# Patient Record
Sex: FEMALE | Race: WHITE | NOT HISPANIC OR LATINO | Employment: OTHER | URBAN - METROPOLITAN AREA
[De-identification: names, ages, dates, MRNs, and addresses within clinical notes are randomized per-mention and may not be internally consistent; named-entity substitution may affect disease eponyms.]

---

## 2017-03-06 ENCOUNTER — ALLSCRIPTS OFFICE VISIT (OUTPATIENT)
Dept: OTHER | Facility: OTHER | Age: 77
End: 2017-03-06

## 2017-03-07 ENCOUNTER — GENERIC CONVERSION - ENCOUNTER (OUTPATIENT)
Dept: OTHER | Facility: OTHER | Age: 77
End: 2017-03-07

## 2017-04-25 ENCOUNTER — HOSPITAL ENCOUNTER (OUTPATIENT)
Dept: RADIOLOGY | Age: 77
Discharge: HOME | End: 2017-04-25
Admitting: FAMILY MEDICINE
Payer: MEDICARE

## 2017-04-25 ENCOUNTER — TRANSCRIBE ORDERS (OUTPATIENT)
Dept: URGENT CARE | Age: 77
End: 2017-04-25

## 2017-04-25 ENCOUNTER — OFFICE VISIT (OUTPATIENT)
Dept: URGENT CARE | Age: 77
End: 2017-04-25
Payer: MEDICARE

## 2017-04-25 DIAGNOSIS — R05.9 COUGH: ICD-10-CM

## 2017-04-25 PROCEDURE — G0463 HOSPITAL OUTPT CLINIC VISIT: HCPCS | Performed by: FAMILY MEDICINE

## 2017-04-25 PROCEDURE — 71020 HB CHEST X-RAY 2VW FRONTAL&LATL: CPT

## 2017-04-25 PROCEDURE — 94640 AIRWAY INHALATION TREATMENT: CPT | Performed by: FAMILY MEDICINE

## 2017-04-25 PROCEDURE — 99213 OFFICE O/P EST LOW 20 MIN: CPT | Performed by: FAMILY MEDICINE

## 2017-05-02 ENCOUNTER — OFFICE VISIT (OUTPATIENT)
Dept: URGENT CARE | Age: 77
End: 2017-05-02
Payer: MEDICARE

## 2017-05-02 LAB — GLUCOSE SERPL-MCNC: 242 MG/DL (ref 65–140)

## 2017-05-02 PROCEDURE — 82948 REAGENT STRIP/BLOOD GLUCOSE: CPT

## 2017-05-02 PROCEDURE — 99213 OFFICE O/P EST LOW 20 MIN: CPT

## 2017-05-02 PROCEDURE — 94640 AIRWAY INHALATION TREATMENT: CPT | Performed by: FAMILY MEDICINE

## 2017-05-02 PROCEDURE — G0463 HOSPITAL OUTPT CLINIC VISIT: HCPCS

## 2017-05-10 ENCOUNTER — TRANSCRIBE ORDERS (OUTPATIENT)
Dept: LAB | Facility: HOSPITAL | Age: 77
End: 2017-05-10

## 2017-05-10 ENCOUNTER — APPOINTMENT (OUTPATIENT)
Dept: LAB | Facility: HOSPITAL | Age: 77
End: 2017-05-10
Payer: MEDICARE

## 2017-05-10 DIAGNOSIS — E78.5 HYPERLIPIDEMIA, UNSPECIFIED HYPERLIPIDEMIA TYPE: ICD-10-CM

## 2017-05-10 DIAGNOSIS — E11.69 TYPE 2 DIABETES MELLITUS WITH OTHER SPECIFIED COMPLICATION (HCC): ICD-10-CM

## 2017-05-10 DIAGNOSIS — I10 ESSENTIAL HYPERTENSION, MALIGNANT: ICD-10-CM

## 2017-05-10 DIAGNOSIS — I10 ESSENTIAL HYPERTENSION, MALIGNANT: Primary | ICD-10-CM

## 2017-05-10 LAB
ALBUMIN SERPL BCP-MCNC: 3.2 G/DL (ref 3.5–5)
ALP SERPL-CCNC: 84 U/L (ref 46–116)
ALT SERPL W P-5'-P-CCNC: 25 U/L (ref 12–78)
ANION GAP SERPL CALCULATED.3IONS-SCNC: 4 MMOL/L (ref 4–13)
AST SERPL W P-5'-P-CCNC: 16 U/L (ref 5–45)
BASOPHILS # BLD AUTO: 0.03 THOUSANDS/ΜL (ref 0–0.1)
BASOPHILS NFR BLD AUTO: 1 % (ref 0–1)
BILIRUB SERPL-MCNC: 0.85 MG/DL (ref 0.2–1)
BUN SERPL-MCNC: 16 MG/DL (ref 5–25)
CALCIUM SERPL-MCNC: 8.8 MG/DL (ref 8.3–10.1)
CHLORIDE SERPL-SCNC: 105 MMOL/L (ref 100–108)
CHOLEST SERPL-MCNC: 109 MG/DL (ref 50–200)
CO2 SERPL-SCNC: 32 MMOL/L (ref 21–32)
CREAT SERPL-MCNC: 0.94 MG/DL (ref 0.6–1.3)
CREAT UR-MCNC: 133 MG/DL
EOSINOPHIL # BLD AUTO: 0.2 THOUSAND/ΜL (ref 0–0.61)
EOSINOPHIL NFR BLD AUTO: 3 % (ref 0–6)
ERYTHROCYTE [DISTWIDTH] IN BLOOD BY AUTOMATED COUNT: 15 % (ref 11.6–15.1)
EST. AVERAGE GLUCOSE BLD GHB EST-MCNC: 126 MG/DL
GFR SERPL CREATININE-BSD FRML MDRD: 57.9 ML/MIN/1.73SQ M
GLUCOSE P FAST SERPL-MCNC: 88 MG/DL (ref 65–99)
HBA1C MFR BLD: 6 % (ref 4.2–6.3)
HCT VFR BLD AUTO: 45.2 % (ref 34.8–46.1)
HDLC SERPL-MCNC: 63 MG/DL (ref 40–60)
HGB BLD-MCNC: 14.4 G/DL (ref 11.5–15.4)
LDLC SERPL CALC-MCNC: 32 MG/DL (ref 0–100)
LYMPHOCYTES # BLD AUTO: 1.43 THOUSANDS/ΜL (ref 0.6–4.47)
LYMPHOCYTES NFR BLD AUTO: 22 % (ref 14–44)
MCH RBC QN AUTO: 29.4 PG (ref 26.8–34.3)
MCHC RBC AUTO-ENTMCNC: 31.9 G/DL (ref 31.4–37.4)
MCV RBC AUTO: 92 FL (ref 82–98)
MICROALBUMIN UR-MCNC: 40.4 MG/L (ref 0–20)
MICROALBUMIN/CREAT 24H UR: 30 MG/G CREATININE (ref 0–30)
MONOCYTES # BLD AUTO: 0.55 THOUSAND/ΜL (ref 0.17–1.22)
MONOCYTES NFR BLD AUTO: 8 % (ref 4–12)
NEUTROPHILS # BLD AUTO: 4.31 THOUSANDS/ΜL (ref 1.85–7.62)
NEUTS SEG NFR BLD AUTO: 66 % (ref 43–75)
NRBC BLD AUTO-RTO: 0 /100 WBCS
PLATELET # BLD AUTO: 183 THOUSANDS/UL (ref 149–390)
PMV BLD AUTO: 10.8 FL (ref 8.9–12.7)
POTASSIUM SERPL-SCNC: 4.4 MMOL/L (ref 3.5–5.3)
PROT SERPL-MCNC: 6.9 G/DL (ref 6.4–8.2)
RBC # BLD AUTO: 4.89 MILLION/UL (ref 3.81–5.12)
SODIUM SERPL-SCNC: 141 MMOL/L (ref 136–145)
TRIGL SERPL-MCNC: 69 MG/DL
WBC # BLD AUTO: 6.56 THOUSAND/UL (ref 4.31–10.16)

## 2017-05-10 PROCEDURE — 82043 UR ALBUMIN QUANTITATIVE: CPT | Performed by: FAMILY MEDICINE

## 2017-05-10 PROCEDURE — 80061 LIPID PANEL: CPT

## 2017-05-10 PROCEDURE — 87517 HEPATITIS B DNA QUANT: CPT

## 2017-05-10 PROCEDURE — 85025 COMPLETE CBC W/AUTO DIFF WBC: CPT

## 2017-05-10 PROCEDURE — 83036 HEMOGLOBIN GLYCOSYLATED A1C: CPT

## 2017-05-10 PROCEDURE — 80053 COMPREHEN METABOLIC PANEL: CPT

## 2017-05-10 PROCEDURE — 36415 COLL VENOUS BLD VENIPUNCTURE: CPT

## 2017-05-10 PROCEDURE — 82570 ASSAY OF URINE CREATININE: CPT | Performed by: FAMILY MEDICINE

## 2017-05-11 LAB
HBV DNA SERPL NAA+PROBE-ACNC: NORMAL IU/ML
HBV DNA SERPL NAA+PROBE-LOG IU: NORMAL LOG10IU/ML
REF LAB TEST REF RANGE: NORMAL

## 2017-05-15 ENCOUNTER — ALLSCRIPTS OFFICE VISIT (OUTPATIENT)
Dept: OTHER | Facility: OTHER | Age: 77
End: 2017-05-15

## 2017-12-11 ENCOUNTER — GENERIC CONVERSION - ENCOUNTER (OUTPATIENT)
Dept: CARDIOLOGY CLINIC | Facility: CLINIC | Age: 77
End: 2017-12-11

## 2018-01-13 NOTE — PROGRESS NOTES
Assessment  Assessed    1  Chronic diastolic congestive heart failure (428 32,428 0) (I50 32)   2  Mitral stenosis (394 0) (I05 0)   3  Essential hypertension (401 9) (I10)    Plan  Chronic diastolic congestive heart failure    · Torsemide 20 MG Oral Tablet; take 1 tablet every Tuesday and Saturday   Rx By: Monica Sharpe; Dispense: 30 Days ; #:30 Tablet; Refill: 5; For: Chronic diastolic congestive heart failure; JOSE = N; Verified Transmission to [id number]; Last Updated By: System, [employer]; 1/26/2016 1:27:57 PM   · Restrict your sodium (salt) intake to 2 grams per day ; Status:Complete;   Done:  80WLA1769   Ordered; For:Chronic diastolic congestive heart failure; Ordered By:Elda Bryant;   · Weigh yourself every day ; Status:Complete;   Done: 29RII3874   Ordered; For:Chronic diastolic congestive heart failure; Ordered By:Elda Bryant;   · 1 - Fallon Joseph (Cardiology) Physician Referral  Consult  Status: Complete  Done:  92RJZ1856   Ordered; For: Chronic diastolic congestive heart failure; Ordered By: Monica Sharpe Performed:  Due: 32BCX6143; Last Updated By: Sea Giang; 1/26/2016 1:37:08 PM  Care Summary provided  : Yes    Discussion/Summary  Cardiology Discussion Summary Free Text Note Form [facility]:   I had the pleasure of seeing Chiki Dillon today in heart failure follow up  She was admitted In November 2015 with progressive dyspnea and acute hypoxic hypercapnic respiratory failure requiring BiPAP  The patient was noted to have an acute on chronic diastolic CHF exacerbation treated with IV diuresis  The patient's daughter had signed her mother out against medical advice  Torsemide 20 mg daily had been prescribed for maintenance of her fluid status  The patient was seen in post hospital follow up 11/30/15 with her daughter appearing mildly decompensated  Karishma's daughter had stopped giving her mother her diuretic due to increased urination   The patient and her daughter were given extensive heart failure education  They were encouraged to adhere to a 2 g sodium diet 1500 mL fluid restriction  Compliance with daily, torsemide 20 mg was also highly encouraged  Cale Mccord, heart failure RN, also met with the patient and her family during the office visit to provide further education and promote compliance  The patient's daughter was instructed to contact heart failure program in the event that her mother gained 3 pounds in one day or 5 pounds in one week, developed progressive edema, or shortness of breath  She returns to the office today for a follow up visit with ongoing hypervolemia, likely chronic in nature  Karishma's daughter has not been giving her mother the torsemide due to concern regarding recurrent increased urination  She will likley not comply with a daily diuretic but was encouraged to administer torsemide 2-3x weekly  The patient's daughter states she will not give it over the weekend but would consider giving it on Tuesday and Thursdays  Her most recent lab work completed in in December was reviewed with unremarkable results  She will be seen in routine follow up with Dr Eden Avila in the next several months and we will see Ms Olga Monge in the interim should the need arise  Chief Complaint  Chief Complaint Free Text Note Form: F/U OV      History of Present Illness  Cardiology HPI Free Text Note Form [facility]: I had the pleasure of seeing Leif Laws today in heart failure follow up  She is a 78-year-old [place] speaking demented female with a past medical history significant for type 2 diabetes, hypertension, dyslipidemia, hepatitis B, moderate mitral stenosis, mild to moderate mitral regurgitation, preserved left ventricle systolic function, chronic diastolic heart failure, probable objective sleep apnea, and chronic hypercapnic respiratory failure  The patient was admitted 11/2015 with reported complaints of progressive dyspnea over a weeks time   She was noted to have acute on chronic hypoxic hypercapnic respiratory failure requiring BiPAP  Her chest x-ray revealed increased pulmonary vascular congestion and her BNP was elevated up to 973  The patient was treated with IV diuretics and was able to be weaned off BiPAP  She was noted to have some increased confusion to her hospitalization, which was felt to be a combination of pain medication and her dementia  The patient was signed out by her daughter [address] prior to discharge was prescribed torsemide 20 mg daily for maintenance of her fluid status  The patient and her daughter present to the office for follow up on 11/30 and she appeared mildly decompensated  Karishma's daughter admitted that she stopped giving her mother the diuretic due to increased urination  Ms Katie Soriano is a very poor historian and could not provide any further information  The patient had not been weighed nor had a low-sodium diet or fluid restriction been followed  The patient and daugther were encouraged to compy with her diuretic and a follow up visit was scheduled  Her daughter has rescheduled this appointment several times  The patient and her daughter present to the office today with ongoing non compliance and mildly decompensated heart failure  She has increased jugular venous pressures on exam, reduced breath sounds in the bilateral bases and 1+ bilateral ankle edema  Her lab work completed on 12/8/15 was reviewed with stable renal function and electrolytes  Her VS are stable but the patient cannot provide any further meaningful history  Mitral Stenosis (Brief): The patient is being seen for a routine clinic follow-up of mitral stenosis  Symptoms:  no fatigue, no shortness of breath, no leg swelling and no orthopnea  Associated symptoms:  no coughing, no wheezing, no paroxysmal nocturnal dyspnea, no weight gain and no hemoptysis  Hypertension (Follow-Up): The patient presents for follow-up of essential hypertension   The patient states she has been stable with her blood pressure control since the last visit  Comorbid Illnesses: cardiac failure  Symptoms: denies impaired vision, improved dyspnea, denies chest pain, denies intermittent leg claudication and improved lower extremity edema  Associated symptoms include no headache and no focal neurologic deficits  Blood pressure control has been good  Disease Management: the patient is doing well with her blood pressure goals  Congestive Heart Failure (Follow-Up): The patient presents for follow-up of acute on chronic, diastolic heart failure  The patient's last LV ejection fraction was 65%  The patient is NYHA functional Class III  The patient states she has been stable with her heart failure symptoms since the last visit  Symptoms: improved lower extremity edema, improved dyspnea on exertion, improved fatigue and improved exercise intolerance  Associated symptoms include no chest pain and no syncope  Home monitoring: The patient is not checking weight at home  Weight control has been poor  Medications: the patient is not adherent with her medication regimen  Disease Management: the patient is not doing well with her heart failure goals  Review of Systems  Cardiology Female ROS:     Cardiac: has swelling in the , but no chest pain and no syncope/fainting  Skin: No complaints of nonhealing sores or skin rash  Genitourinary: frequent urination at night, but no kidney problems   Psychological: anxiety  General: no appetite changes  Respiratory: no shortness of breath  HEENT: snoring   Gastrointestinal: No complaints of liver problems, nausea, vomiting, heartburn, constipation, bloody stools, diarrhea, problems swallowing, adbominal pain, or rectal bleeding  Hematologic: No complaints of bleeding disorders, anemia, blood clots, or excessive brusing  Neurological: no headaches   Musculoskeletal: No complaints of arthritis, back pain, or painfull swelling        Active Problems  Problems    1  Acute bronchitis (466 0) (J20 9)   2  Ambulatory dysfunction (719 7) (R26 2)   3  Anemia (285 9) (D64 9)   4  Bilateral hearing loss due to cerumen impaction (389 8,380 4) (H91 8X9,H61 20)   5  C  difficile colitis (008 45) (A04 7)   6  C2 cervical fracture (805 02) (S12 100A)   7  Candidiasis, cutaneous (112 3) (B37 2)   8  Cerumen impaction (380 4) (H61 20)   9  Cervical transverse process fracture (805 00) (S12 9XXA)   10  Chronic diastolic congestive heart failure (428 32,428 0) (I50 32)   11  Closed fracture of proximal phalanx, initial encounter (816 01) (S62 619A)   12  Closed fracture of thoracic vertebra (805 2) (S22 009A)   13  Conjunctivitis (372 30) (H10 9)   14  Dementia without behavioral disturbance (294 20) (F03 90)   15  Depression (311) (F32 9)   16  Diabetes mellitus type 2 in obese (250 00,278 00) (E11 9,E66 9)   17  Disc degeneration (722 6)   18  Edema leg (782 3) (R60 0)   19  Encounter for routine gynecological examination (V72 31) (Z01 419)   20  Erythromelalgia (443 82) (I73 81)   21  Essential hypertension (401 9) (I10)   22  Foot Pain (Soft Tissue) (729 5)   23  Fracture of left clavicle (810 00) (S42 002A)   24  Hand injury (959 4) (S69 90XA)   25  Hearing Loss (389 9)   26  Hyperkeratosis (701 1) (L85 9)   27  Idiopathic osteoporosis (733 02) (M81 8)   28  Injury of thoracic spine (952 10) (S24 109A)   29  Intertrigo (695 89) (L30 4)   30  Kyphoscoliosis (737 30) (M41 9)   31  Major depressive disorder (296 20) (F32 9)   32  Mitral regurgitation (424 0) (I34 0)   33  Mitral stenosis (394 0) (I05 0)   34  Morbid or severe obesity due to excess calories (278 01) (E66 01)   35  Murmur (785 2) (R01 1)   36  Onychomycosis of toenail (110 1) (B35 1)   37  Osteoarthritis of left knee (715 96) (M17 9)   38  Osteoarthritis of right knee (715 96) (M17 9)   39  Osteoporosis (733 00) (M81 0)   40  Pruritus (698 9) (L29 9)   41  Respiratory failure (518 81) (J96 90)   42  Sore throat (462) (J02 9)   43  Thumb fracture (816 00) (S62 509A)   44  Tinea pedis (110 4) (B35 3)   45  Traumatic hematoma of forehead (920) (S00 83XA)   46  Urinary retention (788 20) (R33 9)   47  UTI (urinary tract infection) (599 0) (N39 0)    Past Medical History  Problems    1  History of Alzheimer disease (331 0) (G30 9)   2  History of Depression with anxiety (300 4) (F41 8)   3  History of Fall down steps (E880 9) (W10 8XXA)   4  History of Gastrointestinal symptoms (787 99) (R19 8)   5  History of Hematochezia (578 1) (K92 1)   6  History of diabetes mellitus (V12 29) (Z86 39)   7  History of Injury of kidney (866 00) (S37 009A)   8  History of Irregular heart beat (427 9) (I49 9)   9  Personal history of arthritis (V13 4) (Z87 39)   10  History of Senile dementia, uncomplicated (842 5) (M26 51)  Active Problems And Past Medical History Reviewed: The active problems and past medical history were reviewed and updated today  Surgical History  Problems    1  History of Appendectomy   2  History of  Section   3  History of Gallbladder Surgery  Surgical History Reviewed: The surgical history was reviewed and updated today  Family History  Mother    1  Family history of    2  Family history of cardiac disorder (V17 49) (Z82 49)  Father    3  Family history of    4  Family history of cardiac disorder (V17 49) (Z82 49)  Family History    5  Family history of Diabetes Mellitus (V18 0)   6  Family history of Osteoporosis (V17 81)   7  Family history of Reported Cholesterol Level Was High   8  Family history of Reported Family History Of Heart Disease  Family History Reviewed: The family history was reviewed and updated today  Social History  Problems    · Marital History -    · Never A Smoker   · Never Drank Alcohol   · Never Used Drugs   · Occupation: Retired   · Parentage  Social History Reviewed: The social history was reviewed and updated today   The social history was reviewed and is unchanged  Current Meds   1  Alendronate Sodium 70 MG Oral Tablet; TAKE 1 TABLET Weekly; Therapy: 71PTQ0420 to (Shamika Gwendolynnew)  Requested for: 44HPB7694; Last   Rx:18Jun2015 Ordered   2  Atorvastatin Calcium 20 MG Oral Tablet; Take 1 tablet by mouth at bedtime; Therapy: 59WGQ7085 to (Evaluate:60Mja4219)  Requested for: 83Mhl2078; Last   Rx:71Yna4255 Ordered   3  Donepezil HCl - 10 MG Oral Tablet; TAKE 1 TABLET DAILY AS DIRECTED; Therapy: 11TTE8252 to (Evaluate:32Hyc6285)  Requested for: 66Ybw8522; Last   Rx:27Wuv9762 Ordered   4  Glimepiride 1 MG Oral Tablet; Take 1 tablet twice daily; Therapy: (Recorded:30Nov2015) to Recorded   5  Ketorolac Tromethamine 0 4 % Ophthalmic Solution; Therapy: 95Sra2480 to Recorded   6  Metoprolol Tartrate 25 MG Oral Tablet; TAKE 1 TABLET TWICE DAILY; Therapy: 83NKR5508 to (Evaluate:07Jun2016)  Requested for: 34TUD1035; Last   Rx:10Nov2015 Ordered   7  Mirtazapine 15 MG Oral Tablet; TAKE 1 TABLET Bedtime; Therapy: 15Did6654 to (Evaluate:55Ztl9192)  Requested for: 53Eag1654; Last   Rx:27Knr6107 Ordered   8  Nystatin 478616 UNIT/GM External Powder; APPLY 2-3 TIMES DAILY TO AFFECTED   AREA(S); Therapy: 26Orm1679 to (Last Rx:08Oct2014)  Requested for: 87CQN9618 Ordered   9  Tobramycin 0 3 % Ophthalmic Solution; INITIALLY 2 DROPS EVERY HOUR FOR DAY 1,   THEN 2 DROPS 4 TIMES DAILY; Therapy: 15BTN3739 to (Last Rx:20Nov2015)  Requested for: 20Nov2015 Ordered   10  Torsemide 20 MG Oral Tablet; TAKE 1 TABLET ONCE DAILY; Therapy: 55DXA2261 to (Ute Wilcox)  Requested for: 62LBC7025; Last    Rx:30Nov2015 Ordered   11  Tradjenta 5 MG Oral Tablet; TAKE 1 TABLET DAILY; Therapy: (Recorded:30Nov2015) to Recorded   12  TraMADol HCl - 50 MG Oral Tablet; TAKE 1 TO 2 TABLETS EVERY 6 HOURS AS NEEDED    FOR PAIN;    Therapy: 48VIX7595 to (Evaluate:43Sfe5355); Last Rx:85Dhl7257 Ordered  Medication List Reviewed:    The medication list was reviewed and updated today  Allergies  Medication    1  No Known Drug Allergies    Vitals  Vital Signs [Data Includes: Current Encounter]    Recorded: 20EHW6367 01:13PM   Heart Rate 68   Systolic 687, RUE, Sitting   Diastolic 60, RUE, Sitting   Height 5 ft 5 in   Weight 186 lb 6 oz   BMI Calculated 31 01   BSA Calculated 1 92     Physical Exam    Constitutional   General appearance: Abnormal   obese  Eyes   Conjunctiva and Sclera examination: Conjunctiva pink, sclera anicteric  Ears, Nose, Mouth, and Throat - External inspection of ears and nose: Normal without deformities or discharge  Neck   Neck and thyroid: Abnormal   mild increased JVP  Pulmonary   Respiratory effort: No increased work of breathing or signs of respiratory distress  Auscultation of lungs: Abnormal   decreased BS bases  Cardiovascular   Palpation of heart: Normal PMI, no thrills  Auscultation of heart: Abnormal   RRR 2/6 MADELAINE  Examination of extremities for edema and/or varicosities: Abnormal   1+ BL ankle edema  Chest - Chest: Normal    Abdomen   Abdomen: Non-tender and no distention  Musculoskeletal Gait and station: Normal gait  Skin - Skin and subcutaneous tissue: Normal without rashes or lesions  Skin is warm and well perfused, normal turgor  Psychiatric - Mood and affect: Normal       Results/Data  Diagnostic Studies Reviewed Cardio:   Lab Review: 12/8/15  BUN 15 Cr 0 78 Na 141 K 4 5      Future Appointments    Date/Time Provider Specialty Site   02/26/2016 01:40 PM Yaya Hurtado DO Cardiology [facility]ARDIOLOG[facility]   02/19/2016 02:30 PM JOSE Barrera   Orthopedic Surgery [facility]     Signatures   Electronically signed by : Marisela Chandler [facility]; Jan 26 2016  1:44PM EST                       (Author)    Electronically signed by : Tameka Esqueda DO; Jan 27 2016  3:20PM EST                       (Author)

## 2018-01-14 VITALS
HEART RATE: 68 BPM | SYSTOLIC BLOOD PRESSURE: 104 MMHG | HEIGHT: 65 IN | BODY MASS INDEX: 31.24 KG/M2 | WEIGHT: 187.5 LBS | OXYGEN SATURATION: 94 % | DIASTOLIC BLOOD PRESSURE: 58 MMHG

## 2018-01-14 VITALS
WEIGHT: 183.25 LBS | OXYGEN SATURATION: 93 % | HEART RATE: 89 BPM | DIASTOLIC BLOOD PRESSURE: 58 MMHG | BODY MASS INDEX: 30.53 KG/M2 | SYSTOLIC BLOOD PRESSURE: 108 MMHG | HEIGHT: 65 IN